# Patient Record
Sex: FEMALE | Race: WHITE | NOT HISPANIC OR LATINO | URBAN - NONMETROPOLITAN AREA
[De-identification: names, ages, dates, MRNs, and addresses within clinical notes are randomized per-mention and may not be internally consistent; named-entity substitution may affect disease eponyms.]

---

## 2018-12-19 PROBLEM — Z98.42: Noted: 2018-12-19

## 2018-12-19 PROBLEM — H26.493: Noted: 2018-12-19

## 2018-12-19 PROBLEM — Z98.41: Noted: 2018-12-19

## 2019-01-11 ENCOUNTER — IMPORTED ENCOUNTER (OUTPATIENT)
Dept: URBAN - NONMETROPOLITAN AREA CLINIC 1 | Facility: CLINIC | Age: 74
End: 2019-01-11

## 2019-01-11 PROCEDURE — 99024 POSTOP FOLLOW-UP VISIT: CPT

## 2019-01-11 PROCEDURE — 92015 DETERMINE REFRACTIVE STATE: CPT

## 2019-01-11 NOTE — PATIENT DISCUSSION
3 week POV CE OS 12/18/18 CE OD 12/12/18 Standard OU- Discussed diagnosis in detail with patient- Patient is stable and doing well- Continue all post op drops as directed- PCO OU noted but stable and no treatment needed at this time - Continue to monitor- RTC 3 months for [facility] and [facility]; 's Notes: MR 1/11/19DFE 2/12/18OCT 8/10/18VF 8/10/18OPTOS 2/12/18PACH 8/10/18

## 2019-04-12 ENCOUNTER — IMPORTED ENCOUNTER (OUTPATIENT)
Dept: URBAN - NONMETROPOLITAN AREA CLINIC 1 | Facility: CLINIC | Age: 74
End: 2019-04-12

## 2019-04-12 PROBLEM — H40.013: Noted: 2019-04-12

## 2019-04-12 PROBLEM — H04.123: Noted: 2019-04-12

## 2019-04-12 PROBLEM — Z96.1: Noted: 2019-04-12

## 2019-04-12 PROBLEM — H26.493: Noted: 2019-04-12

## 2019-04-12 PROCEDURE — 92012 INTRM OPH EXAM EST PATIENT: CPT

## 2019-04-12 NOTE — PATIENT DISCUSSION
Pseudophakia with mild PCO OUDiscussed diagnosis in detail with patient. PCO not visuall significant at this time. Both intraocular lenses in place today. Continue to monitor. Glaucoma Suspect secondary to cupping OUDiscussed diagnosis with patient. Positive family history of disease (brother). Discussed the chronic progressive nature of this disease and various treatment options. RTC x 6 months for VF and OCT.TEOFILO/Allergies OUDiscussed diagnosis in detail with patient. Recommend Thera Tears EXTRA PRN OU. Recommend Bepreve BID OU x 1 week sample given today. Continue to monitor.; 's Notes: MR 1/11/19DFE 4/12/19OCT 8/10/18VF 8/10/18OPTOS 2/12/18PACH 8/10/18

## 2019-11-08 ENCOUNTER — IMPORTED ENCOUNTER (OUTPATIENT)
Dept: URBAN - NONMETROPOLITAN AREA CLINIC 1 | Facility: CLINIC | Age: 74
End: 2019-11-08

## 2019-11-08 PROCEDURE — 92083 EXTENDED VISUAL FIELD XM: CPT

## 2019-11-08 PROCEDURE — 92014 COMPRE OPH EXAM EST PT 1/>: CPT

## 2019-11-08 PROCEDURE — 92133 CPTRZD OPH DX IMG PST SGM ON: CPT

## 2019-11-08 NOTE — PATIENT DISCUSSION
Pseudophakia with mild PCO OUDiscussed diagnosis in detail with patient. PCO not visuall significant at this time. Both intraocular lenses in place today. Continue to monitor. Glaucoma Suspect secondary to cupping OUDiscussed diagnosis with patient. Positive family history of disease (brother). IOP stable at 14 OD and 15 OS. C/Ds noted at 0.7 OD and 0.6 OS. PACH done previously:  [phone]. Discussed the chronic progressive nature of this disease and various treatment options. OCT done today good RNFL noted. VF done today good with no defects noted. RTC 6 months with Optos GonioDES/Allergies OUDiscussed diagnosis in detail with patient. Recommend Thera Tears EXTRA PRN OU. Recommend Bepreve BID OU x 1 week sample given today. Continue to monitor.; 's Notes: MR 1/11/19DFE 4/12/19OCT 8/10/18VF 8/10/18Optos 2/12/18PACH 8/10/18

## 2020-05-11 ENCOUNTER — IMPORTED ENCOUNTER (OUTPATIENT)
Dept: URBAN - NONMETROPOLITAN AREA CLINIC 1 | Facility: CLINIC | Age: 75
End: 2020-05-11

## 2020-05-11 PROBLEM — H52.4: Noted: 2020-05-11

## 2020-05-11 PROBLEM — Z96.1: Noted: 2020-05-11

## 2020-05-11 PROBLEM — H26.493: Noted: 2020-05-11

## 2020-05-11 PROBLEM — H04.123: Noted: 2019-04-12

## 2020-05-11 PROBLEM — H40.013: Noted: 2020-05-11

## 2020-05-11 PROCEDURE — 92250 FUNDUS PHOTOGRAPHY W/I&R: CPT

## 2020-05-11 PROCEDURE — 92015 DETERMINE REFRACTIVE STATE: CPT

## 2020-05-11 PROCEDURE — 92014 COMPRE OPH EXAM EST PT 1/>: CPT

## 2020-05-11 PROCEDURE — 92020 GONIOSCOPY: CPT

## 2020-05-11 NOTE — PATIENT DISCUSSION
Glaucoma Suspect secondary to cupping OUDiscussed diagnosis with patient. Positive family history of disease (brother). Discussed the chronic progressive nature of this disease and various treatment options. Gonio done today; Grade IV with light pigment OU. Optos done today; stable OU. IOP stable at 12 OD and 14 OS. C/Ds noted at 0.7 OD and 0.6 OS. PACH done previously:  [phone]. Continue to monitor. RTC 6 months with VF 24-2 and OCT Pseudophakia with mild PCO OUDiscussed diagnosis in detail with patient. PCO not visually significant at this time. Both intraocular lenses in place today. Continue to monitor. TEOFILO/Allergies OUDiscussed diagnosis in detail with patient. Continue Thera Tears EXTRA PRN OU. Continue to monitor. Presbyopia OUDiscussed refractive status in detail with patient. New glasses Rx given today. Continue to monitor.; 's Notes: MR  5/11/20DFE  4/12/19OCT  11/8/19VF   11/8/19OPTOS  5/11/20GONIO  5/11/20PACH  8/10/18

## 2020-07-17 ENCOUNTER — IMPORTED ENCOUNTER (OUTPATIENT)
Dept: URBAN - NONMETROPOLITAN AREA CLINIC 1 | Facility: CLINIC | Age: 75
End: 2020-07-17

## 2020-07-17 PROBLEM — H52.4: Noted: 2020-07-17

## 2020-07-17 PROBLEM — H04.123: Noted: 2020-07-17

## 2020-07-17 PROBLEM — H04.123: Noted: 2021-10-14

## 2020-07-17 PROBLEM — H40.013: Noted: 2021-10-14

## 2020-07-17 PROBLEM — H16.223: Noted: 2021-10-14

## 2020-07-17 PROBLEM — Z96.1: Noted: 2020-07-17

## 2020-07-17 PROBLEM — H26.493: Noted: 2020-07-17

## 2020-07-17 PROBLEM — H40.013: Noted: 2020-07-17

## 2020-07-17 PROCEDURE — 92012 INTRM OPH EXAM EST PATIENT: CPT

## 2020-07-17 NOTE — PATIENT DISCUSSION
TEOIFLO OUDiscussed diagnosis in detail with patient. Discussed the chronic nature and treatment options with the patient. 1+SPK noted OU. Start Refresh Gel QHS OU sample given today. Continue Thera Tears BID-QID OU. Continue to monitor. Glaucoma Suspect secondary to cupping OUDiscussed diagnosis with patient. Positive family history of disease (brother). Discussed the chronic progressive nature of this disease and various treatment options. IOP stable at 14 OU. C/Ds noted at 0.7 OD and 0.6 OS. PACH done previously:  [phone]. Continue to monitor. RTC A/S 6 months with VF 24-2 and OCT Pseudophakia with mild PCO OUDiscussed diagnosis in detail with patient. PCO not visually significant at this time. Both intraocular lenses in place today. Continue to monitor. TEOFILO/Allergies OUDiscussed diagnosis in detail with patient. Continue Thera Tears EXTRA PRN OU. Continue to monitor. Presbyopia OUDiscussed refractive status in detail with patient. Continue to monitor.; 's Notes: MR  5/11/20DFE  4/12/19OCT  11/8/19VF   11/8/19OPTOS  5/11/20GONIO  5/11/20PACH  8/10/18

## 2021-10-14 ENCOUNTER — IMPORTED ENCOUNTER (OUTPATIENT)
Dept: URBAN - NONMETROPOLITAN AREA CLINIC 1 | Facility: CLINIC | Age: 76
End: 2021-10-14

## 2021-10-14 PROBLEM — Z96.1: Noted: 2020-07-17

## 2021-10-14 PROBLEM — H40.013: Noted: 2021-10-14

## 2021-10-14 PROBLEM — H52.4: Noted: 2020-07-17

## 2021-10-14 PROBLEM — H16.223: Noted: 2021-10-14

## 2021-10-14 PROCEDURE — 99214 OFFICE O/P EST MOD 30 MIN: CPT

## 2021-10-14 PROCEDURE — 92133 CPTRZD OPH DX IMG PST SGM ON: CPT

## 2021-10-14 PROCEDURE — 92015 DETERMINE REFRACTIVE STATE: CPT

## 2021-10-14 NOTE — PATIENT DISCUSSION
Glaucoma Suspect secondary to cupping OUDiscussed diagnosis with patient. Positive family history of disease (brother). Discussed the chronic progressive nature of this disease and various treatment options. IOP stable at 14 OU. C/Ds noted at 0.7 OD and 0.6 OS. PACH done previously:  [id number]. OCT done todya OU shows No NFL thinning Continue to monitor. RTC A/S 6 months with VF 24-2  TEOFILO OUDiscussed diagnosis in detail with patient. Discussed the chronic nature and treatment options with the patient. Continue Refresh Gel QHS OU sample given today. Continue Thera Tears BID-QID OU. Continue to monitor. Pseudophakia with mild PCO OUDiscussed diagnosis in detail with patient. PCO not visually significant at this time. Both intraocular lenses in place today. Continue to monitor. TEOFILO/Allergies OUDiscussed diagnosis in detail with patient. Continue Thera Tears EXTRA PRN OU. Continue to monitor. Presbyopia OUDiscussed refractive status in detail with patient. Nwe glasses RX given today Continue to monitor.; 's Notes: MR  10/14/21DFE  4/12/19OCT  10/14/21VF   11/8/19OPTOS  5/11/20GONIO  5/11/20PACH  8/10/18

## 2022-04-09 ASSESSMENT — VISUAL ACUITY
OS_SC: 20/25-1
OD_GLARE: 20/40-
OS_SC: 20/20
OD_CC: 20/20
OD_CC: 20/25
OS_GLARE: 20/40
OS_CC: 20/20-
OS_SC: 20/20-
OD_SC: 20/20
OD_SC: 20/25-
OS_SC: 20/25
OD_SC: 20/20-1
OS_CC: 20/25
OD_SC: 20/20-

## 2022-04-09 ASSESSMENT — TONOMETRY
OD_IOP_MMHG: 14
OD_IOP_MMHG: 14
OD_IOP_MMHG: 16
OS_IOP_MMHG: 14
OS_IOP_MMHG: 15
OS_IOP_MMHG: 16
OD_IOP_MMHG: 12
OD_IOP_MMHG: 14
OD_IOP_MMHG: 12
OS_IOP_MMHG: 13
OS_IOP_MMHG: 14
OS_IOP_MMHG: 14

## 2022-04-09 ASSESSMENT — PACHYMETRY
OD_CT_UM: 584; ADJ: THICK
OD_CT_UM: 584; ADJ: THICK
OS_CT_UM: 570; ADJ: WNL
OD_CT_UM: 584; ADJ: THICK
OS_CT_UM: 570; ADJ: WNL
OD_CT_UM: 584; ADJ: THICK

## 2022-04-14 ENCOUNTER — FOLLOW UP (OUTPATIENT)
Dept: URBAN - NONMETROPOLITAN AREA CLINIC 1 | Facility: CLINIC | Age: 77
End: 2022-04-14

## 2022-04-14 DIAGNOSIS — H40.013: ICD-10-CM

## 2022-04-14 PROCEDURE — 92083 EXTENDED VISUAL FIELD XM: CPT

## 2022-04-14 PROCEDURE — 99214 OFFICE O/P EST MOD 30 MIN: CPT

## 2022-04-14 PROCEDURE — 92133 CPTRZD OPH DX IMG PST SGM ON: CPT

## 2022-04-14 ASSESSMENT — VISUAL ACUITY
OS_CC: 20/20-2
OD_CC: 20/20

## 2022-04-14 ASSESSMENT — TONOMETRY
OD_IOP_MMHG: 14
OS_IOP_MMHG: 15

## 2022-10-24 ENCOUNTER — FOLLOW UP (OUTPATIENT)
Dept: URBAN - NONMETROPOLITAN AREA CLINIC 1 | Facility: CLINIC | Age: 77
End: 2022-10-24

## 2022-10-24 DIAGNOSIS — H40.013: ICD-10-CM

## 2022-10-24 DIAGNOSIS — H52.4: ICD-10-CM

## 2022-10-24 PROCEDURE — 92250 FUNDUS PHOTOGRAPHY W/I&R: CPT

## 2022-10-24 PROCEDURE — 92020 GONIOSCOPY: CPT

## 2022-10-24 PROCEDURE — 92015 DETERMINE REFRACTIVE STATE: CPT

## 2022-10-24 PROCEDURE — 99214 OFFICE O/P EST MOD 30 MIN: CPT

## 2022-10-24 ASSESSMENT — TONOMETRY
OS_IOP_MMHG: 18
OD_IOP_MMHG: 18

## 2022-10-24 ASSESSMENT — VISUAL ACUITY
OD_CC: 20/20
OS_CC: 20/20

## 2023-04-24 ENCOUNTER — FOLLOW UP (OUTPATIENT)
Dept: URBAN - NONMETROPOLITAN AREA CLINIC 1 | Facility: CLINIC | Age: 78
End: 2023-04-24

## 2023-04-24 DIAGNOSIS — H26.493: ICD-10-CM

## 2023-04-24 DIAGNOSIS — H40.013: ICD-10-CM

## 2023-04-24 DIAGNOSIS — H16.223: ICD-10-CM

## 2023-04-24 PROCEDURE — 92083 EXTENDED VISUAL FIELD XM: CPT

## 2023-04-24 PROCEDURE — 92133 CPTRZD OPH DX IMG PST SGM ON: CPT

## 2023-04-24 PROCEDURE — 99214 OFFICE O/P EST MOD 30 MIN: CPT

## 2023-04-24 ASSESSMENT — VISUAL ACUITY
OD_CC: 20/20-2
OS_CC: 20/20-2

## 2023-04-24 ASSESSMENT — TONOMETRY
OS_IOP_MMHG: 17
OD_IOP_MMHG: 17

## 2024-01-26 ENCOUNTER — FOLLOW UP (OUTPATIENT)
Dept: URBAN - NONMETROPOLITAN AREA CLINIC 1 | Facility: CLINIC | Age: 79
End: 2024-01-26

## 2024-01-26 DIAGNOSIS — H26.493: ICD-10-CM

## 2024-01-26 DIAGNOSIS — H16.223: ICD-10-CM

## 2024-01-26 DIAGNOSIS — H40.013: ICD-10-CM

## 2024-01-26 DIAGNOSIS — H52.4: ICD-10-CM

## 2024-01-26 PROCEDURE — 92250 FUNDUS PHOTOGRAPHY W/I&R: CPT

## 2024-01-26 PROCEDURE — 92015 DETERMINE REFRACTIVE STATE: CPT

## 2024-01-26 PROCEDURE — 92014 COMPRE OPH EXAM EST PT 1/>: CPT

## 2024-01-26 ASSESSMENT — TONOMETRY
OS_IOP_MMHG: 17
OD_IOP_MMHG: 17

## 2024-01-26 ASSESSMENT — VISUAL ACUITY
OD_CC: 20/50
OD_PH: 20/30-2
OS_CC: 20/60
OS_BAT: 20/60-2
OD_BAT: 20/50-1
OS_PH: 20/50

## 2024-10-21 ENCOUNTER — FOLLOW UP (OUTPATIENT)
Dept: URBAN - NONMETROPOLITAN AREA CLINIC 1 | Facility: CLINIC | Age: 79
End: 2024-10-21

## 2024-10-21 DIAGNOSIS — H40.013: ICD-10-CM

## 2024-10-21 DIAGNOSIS — H16.223: ICD-10-CM

## 2024-10-21 DIAGNOSIS — H26.493: ICD-10-CM

## 2024-10-21 PROCEDURE — 92133 CPTRZD OPH DX IMG PST SGM ON: CPT

## 2024-10-21 PROCEDURE — 99214 OFFICE O/P EST MOD 30 MIN: CPT

## 2024-10-21 PROCEDURE — 92083 EXTENDED VISUAL FIELD XM: CPT

## 2025-04-28 ENCOUNTER — FOLLOW UP (OUTPATIENT)
Age: 80
End: 2025-04-28

## 2025-04-28 DIAGNOSIS — H26.493: ICD-10-CM

## 2025-04-28 DIAGNOSIS — H52.4: ICD-10-CM

## 2025-04-28 DIAGNOSIS — H16.223: ICD-10-CM

## 2025-04-28 DIAGNOSIS — Z96.1: ICD-10-CM

## 2025-04-28 DIAGNOSIS — H40.013: ICD-10-CM

## 2025-04-28 PROCEDURE — 92250 FUNDUS PHOTOGRAPHY W/I&R: CPT

## 2025-04-28 PROCEDURE — 99214 OFFICE O/P EST MOD 30 MIN: CPT

## 2025-04-28 PROCEDURE — 92015 DETERMINE REFRACTIVE STATE: CPT
